# Patient Record
Sex: FEMALE | Race: WHITE | NOT HISPANIC OR LATINO
[De-identification: names, ages, dates, MRNs, and addresses within clinical notes are randomized per-mention and may not be internally consistent; named-entity substitution may affect disease eponyms.]

---

## 2023-12-05 ENCOUNTER — DASHBOARD ENCOUNTERS (OUTPATIENT)
Age: 23
End: 2023-12-05

## 2023-12-05 ENCOUNTER — OFFICE VISIT (OUTPATIENT)
Dept: URBAN - METROPOLITAN AREA CLINIC 54 | Facility: CLINIC | Age: 23
End: 2023-12-05
Payer: COMMERCIAL

## 2023-12-05 VITALS
HEIGHT: 68 IN | SYSTOLIC BLOOD PRESSURE: 112 MMHG | HEART RATE: 93 BPM | DIASTOLIC BLOOD PRESSURE: 78 MMHG | WEIGHT: 180 LBS | BODY MASS INDEX: 27.28 KG/M2 | TEMPERATURE: 97.5 F

## 2023-12-05 DIAGNOSIS — Z86.19 HISTORY OF CLOSTRIDIUM DIFFICILE INFECTION: ICD-10-CM

## 2023-12-05 DIAGNOSIS — K90.89 BILE ACID MALABSORPTION SYNDROME: ICD-10-CM

## 2023-12-05 DIAGNOSIS — R19.7 DIARRHEA, UNSPECIFIED TYPE: ICD-10-CM

## 2023-12-05 DIAGNOSIS — K76.0 NAFLD (NONALCOHOLIC FATTY LIVER DISEASE): ICD-10-CM

## 2023-12-05 PROCEDURE — 99245 OFF/OP CONSLTJ NEW/EST HI 55: CPT

## 2023-12-05 PROCEDURE — 99205 OFFICE O/P NEW HI 60 MIN: CPT

## 2023-12-05 RX ORDER — LORAZEPAM 1 MG/1
1 TABLET AT BEDTIME AS NEEDED TABLET ORAL ONCE A DAY
Status: ACTIVE | COMMUNITY

## 2023-12-05 RX ORDER — GABAPENTIN 800 MG/1
1 TABLET TABLET, FILM COATED ORAL ONCE A DAY
Status: ACTIVE | COMMUNITY

## 2023-12-05 RX ORDER — CEFDINIR 300 MG/1
AS DIRECTED CAPSULE ORAL
Status: ACTIVE | COMMUNITY

## 2023-12-05 RX ORDER — DICYCLOMINE HYDROCHLORIDE 20 MG/1
1 TABLET TABLET ORAL THREE TIMES A DAY
Status: ON HOLD | COMMUNITY

## 2023-12-05 RX ORDER — VALACYCLOVIR 500 MG/1
1 TABLET TABLET, FILM COATED ORAL ONCE A DAY
Status: ACTIVE | COMMUNITY

## 2023-12-05 RX ORDER — PROMETHAZINE HYDROCHLORIDE 50 MG/1
0.5 TABLET AS NEEDED TABLET ORAL
Status: ACTIVE | COMMUNITY

## 2023-12-05 RX ORDER — BENZONATATE 100 MG/1
1 CAPSULE AS NEEDED CAPSULE ORAL THREE TIMES A DAY
Status: ACTIVE | COMMUNITY

## 2023-12-05 RX ORDER — CHOLESTYRAMINE 4 G/9G
1 PACKET MIXED WITH WATER OR NON-CARBONATED DRINK POWDER, FOR SUSPENSION ORAL
Qty: 90 | Refills: 3 | OUTPATIENT
Start: 2023-12-18

## 2023-12-05 NOTE — HPI-TODAY'S VISIT:
12/5/23: Patient was referred by Dr. Huong Pichardo for recurrent C. diff colitis. A copy of this document will be sent to the provider. Pt is a 21 yo female with an extensive past medical history including autoimmune hemolytic anemia requiring splenectomy in 2/2022 complicated by intra-abdominal MRSA abscess, followed by gram-positive bacteremia, recurrent Cdiff s/p eventual fecal transplant in 6/2023, biliary dyskinesia s/p CCY plus ventral and umbilical hernia repaired 5/2023, NAFLD, spinal headache requiring 2 blood patches, kidney stones, adrenal insufficiency, POTS, ADHD, chronic PTSD, MDD with anxiety attacks, chronic insomnia, currently being evaluated for EDs, who presents for third opinion (previously followed by [facility] and [facility]) on persistent diarrhea.  - Diarrhea hx: Pt initially developed frequent, watery BMs after completing 2 months of parenteral abx s/p splenectomy complicated by retroperitoneal abscess and bacteremia in Feb 2022. Initial stool studies were negative for Clostridium difficile colitis. Per [facility] notes, pt underwent EGD/cscope in April 2022, which were reportedly normal. Between February and April 2023, pt had a 2nd colonoscopy for ongoing diarrhea which was unremarkable other than benign polyps. Stool studies at that time was positive for Cdiff. Pt was treated with vancomycin which immediately improved symptoms, but diarrhea recurred 3 weeks after completing abx. Repeat Cdiff toxin was positive in June 2023, indicating pt's first recurrence. She was treated with fidaxomicin again with relief then recurrent symptoms 2 weeks later. At this point pt was sent for FMT which was done by Dr. Ibarra on 6/27/23. Pt unfortunately continues to have diarrhea, complaining of 5-20 BMs per day (6-7 [place] scale); sometimes sees mucus vs blood. She has continued to find answers with [facility] and the [facility], but per pt they think it is anxiety and are she feels like they are giving up on her. Repeat EGD/cscope was done with [facility] in Nov 2023 to rule out MC and Crohn's, both unremarkable. She has had extensive testing with [facility] who attributes her diarrhea to severe bile acid malabsorption rather than recurrent Cdiff despite positive retests. Per consult note: "her lack of response to several courses of vancomycin, fidaxomicin, and fecal transplant indicates that she is a C diff carrier and that her symptoms are not likely due to active C diff infection." [place] did check 72 hour fecal fat and bile acids, fecal protectin, osmolarity and electrolytes - which reportedly showed severe bile acid malabsorption: "Stool tests showed rather strong evidence for something called bile acid malabsorption, although the diarrhea was relatively mild based on the total stool volume. Anyhow, I appreciate that symptoms can wax and wane and you may have had relatively mild symptoms during this stool collection. With the results of bile acid testing I would recommend another trial of a bile acid binder. I believe you have tried colestipol in the past but there are other drugs such as colesevelam or cholestyramine that you should discuss with your local doctors if you have not tried them already." Unclear what other bile acid sequestrants pt has tried with [facility] (if any) as those records are not available. [place] also recommended SIBO testing and per pt she already took Xifaxan without relief.  - NAFLD: Pt reports a hx of PASTOR, unclear if she has ever had a liver bx? Long hx of elevated liver enzymes in the past. Over the last 2 years pt has had intermittent transaminitis, as high as 300. Briefly had jaundice associated with hemolytic anemia. S/p CCY for abnormal HIDA scan. Recent fibroscan showed no significant fibrosis (4.4 kPa) and S3 steatosis (). Most recent laboratory studies show a mild elevation in transaminases, normal alkaline phosphatase and bilirubin. 11/14: AST 21, ALT 50, AP 73, TB 0.3, albumin 4.7, Plt 517, INR 0.97.  CT abd/pelvis W 11/14/23: - No acute abnormality is seen on this exam to explain the patient's symptoms. - Postsurgical change. - Multiple small lymph nodes are seen within the mesentery likely reflecting mesenteric adenitis. - Ventral abdominal wall defect with herniated mesenteric fat.  EGD 11/13/23: - Esophagus: regular z line, 39 cm from incisors - Stomach: Normal appearing mucosa. Random gastric biopsies taken with cold forceps per [facility] protocol for H pylori - Duodenum: normal, cold biopsies taken from the second portion and bulb with cold forceps to evaluate for celiac disease. Biopsy: Reactive gastropathy with focal chronic inflammation; No H pylori, dysplasia, or malignancy. Duodenal mucosa with no significant histopathologic abnormality; no celiac disease.   Colonoscopy 11/13/23:  - Normal colonic mucosa throughout, random cold biopsies taken from each segment evaluate for diarrhea - Normal-appearing terminal ileum, multiple cold biopsies taken. Biopsy: Random colon biopsy - colonic mucosa with focal reactive-appearing lymphoid aggregates; No definite dysplasia or malignancy is seen; Negative for significant active inflammation, intraepithelial lymphocytosis or subepithelial collagen thickening; No definite dysplasia or malignancy is seen. Terminal ileum, biopsy - Ileal mucosa with no significant histopathologic abnormality.  Her PCP is Dr. Pichardo at [facility]. Her psychiatrist is Dr. Connor at [facility] psychiatry in [place]. Her cardiologist is Dr. Edmundo Torres, at [facility] in [place]. Her endocrinologist is Dr. Tolentino at [facility] endocrinology. Pt's [facility] consults have included: internal medicine, GI (with referral to their C diff clinic), cardiology, endocrinology, heme/onc, and medical genetics.

## 2023-12-18 PROBLEM — 1231824009: Status: ACTIVE | Noted: 2023-12-18

## 2023-12-18 PROBLEM — 44332000: Status: ACTIVE | Noted: 2023-12-18

## 2024-01-22 ENCOUNTER — OFFICE VISIT (OUTPATIENT)
Dept: URBAN - METROPOLITAN AREA CLINIC 54 | Facility: CLINIC | Age: 24
End: 2024-01-22

## 2024-08-02 ENCOUNTER — OFFICE VISIT (OUTPATIENT)
Dept: URBAN - METROPOLITAN AREA CLINIC 54 | Facility: CLINIC | Age: 24
End: 2024-08-02
Payer: COMMERCIAL

## 2024-08-02 VITALS
DIASTOLIC BLOOD PRESSURE: 75 MMHG | HEIGHT: 68 IN | HEART RATE: 89 BPM | WEIGHT: 195 LBS | TEMPERATURE: 98.1 F | BODY MASS INDEX: 29.55 KG/M2 | SYSTOLIC BLOOD PRESSURE: 128 MMHG

## 2024-08-02 DIAGNOSIS — K90.89 BILE ACID MALABSORPTION SYNDROME: ICD-10-CM

## 2024-08-02 DIAGNOSIS — R19.7 DIARRHEA, UNSPECIFIED TYPE: ICD-10-CM

## 2024-08-02 DIAGNOSIS — K62.89 RECTAL PAIN: ICD-10-CM

## 2024-08-02 DIAGNOSIS — R11.2 NAUSEA AND VOMITING, UNSPECIFIED VOMITING TYPE: ICD-10-CM

## 2024-08-02 PROCEDURE — 99214 OFFICE O/P EST MOD 30 MIN: CPT | Performed by: PHYSICIAN ASSISTANT

## 2024-08-02 RX ORDER — TRAZODONE HYDROCHLORIDE 100 MG/1
1 TABLET AT BEDTIME TABLET ORAL ONCE A DAY
Status: ACTIVE | COMMUNITY

## 2024-08-02 RX ORDER — NICOTINE 21 MG/24HR
1 PATCH TO SKIN PATCH, TRANSDERMAL 24 HOURS TRANSDERMAL ONCE A DAY
Status: ACTIVE | COMMUNITY

## 2024-08-02 RX ORDER — CLOTRIMAZOLE AND BETAMETHASONE DIPROPIONATE 10; .5 MG/G; MG/G
1 APPLICATION CREAM TOPICAL TWICE A DAY
Status: ACTIVE | COMMUNITY

## 2024-08-02 RX ORDER — OXYCODONE HYDROCHLORIDE AND ACETAMINOPHEN 10; 325 MG/1; MG/1
1 TABLET AS NEEDED TABLET ORAL
Status: ACTIVE | COMMUNITY

## 2024-08-02 RX ORDER — GABAPENTIN 800 MG/1
1 TABLET TABLET, FILM COATED ORAL ONCE A DAY
Status: ACTIVE | COMMUNITY

## 2024-08-02 RX ORDER — MINERAL OIL, PETROLATUM, PHENYLEPHRINE HCL 2.5; 140; 749 MG/G; MG/G; MG/G
AS DIRECTED OINTMENT TOPICAL
Status: ACTIVE | COMMUNITY

## 2024-08-02 RX ORDER — HYDROCORTISONE ACETATE 25 MG/1
1 SUPPOSITORY SUPPOSITORY RECTAL ONCE A DAY
Status: ACTIVE | COMMUNITY

## 2024-08-02 RX ORDER — NICOTINE 14MG/24HR
1 PATCH TO SKIN PATCH, TRANSDERMAL 24 HOURS TRANSDERMAL ONCE A DAY
Status: ACTIVE | COMMUNITY

## 2024-08-02 RX ORDER — BUPROPION HYDROCHLORIDE 150 MG/1
1 TABLET IN THE MORNING TABLET, EXTENDED RELEASE ORAL ONCE A DAY
Status: ACTIVE | COMMUNITY

## 2024-08-02 RX ORDER — CYCLOBENZAPRINE HYDROCHLORIDE 10 MG/1
1 TABLET AT BEDTIME AS NEEDED TABLET, FILM COATED ORAL ONCE A DAY
Status: ACTIVE | COMMUNITY

## 2024-08-02 RX ORDER — VALACYCLOVIR 500 MG/1
1 TABLET TABLET, FILM COATED ORAL ONCE A DAY
Status: ACTIVE | COMMUNITY

## 2024-08-02 RX ORDER — HYDROCORTISONE 25 MG/G
1 APPLICATION CREAM TOPICAL ONCE A DAY
Status: ACTIVE | COMMUNITY

## 2024-08-02 RX ORDER — NICOTINE 7MG/24HR
1 PATCH TO SKIN PATCH, TRANSDERMAL 24 HOURS TRANSDERMAL ONCE A DAY
Status: ACTIVE | COMMUNITY

## 2024-08-02 RX ORDER — DIAZEPAM 5 MG/1
1 TABLET AS NEEDED TABLET ORAL ONCE A DAY
Status: ACTIVE | COMMUNITY

## 2024-08-02 RX ORDER — PROMETHAZINE HYDROCHLORIDE 50 MG/1
0.5 TABLET AS NEEDED TABLET ORAL
Status: ACTIVE | COMMUNITY

## 2024-08-02 RX ORDER — DICLOFENAC SODIUM TOPICAL GEL, 1% 10 MG/G
AS DIRECTED GEL TOPICAL
Status: ACTIVE | COMMUNITY

## 2024-08-02 RX ORDER — ERGOCALCIFEROL 1.25 MG/1
1 CAPSULE CAPSULE ORAL
Status: ACTIVE | COMMUNITY

## 2024-08-02 RX ORDER — CHOLESTYRAMINE 4 G/9G
1 PACKET MIXED WITH WATER OR NON-CARBONATED DRINK POWDER, FOR SUSPENSION ORAL
Qty: 90 | Refills: 3 | Status: ACTIVE | COMMUNITY
Start: 2023-12-18

## 2024-08-02 RX ORDER — LETROZOLE 2.5 MG/1
1 TABLET TABLET, FILM COATED ORAL ONCE A DAY
Status: ACTIVE | COMMUNITY

## 2024-08-02 RX ORDER — NITROGLYCERIN 0.4 MG/1
AS DIRECTED TABLET SUBLINGUAL
Status: ACTIVE | COMMUNITY

## 2024-08-02 NOTE — HPI-TODAY'S VISIT:
12/5/23: Patient was referred by Dr. Huong Pichardo for recurrent C. diff colitis. A copy of this document will be sent to the provider. Pt is a 23 yo female with an extensive past medical history including autoimmune hemolytic anemia requiring splenectomy in 2/2022 complicated by intra-abdominal MRSA abscess, followed by gram-positive bacteremia, recurrent Cdiff s/p eventual fecal transplant in 6/2023, biliary dyskinesia s/p CCY plus ventral and umbilical hernia repaired 5/2023, NAFLD, spinal headache requiring 2 blood patches, kidney stones, adrenal insufficiency, POTS, ADHD, chronic PTSD, MDD with anxiety attacks, chronic insomnia, currently being evaluated for EDs, who presents for third opinion (previously followed by [facility] and [facility]) on persistent diarrhea.  - Diarrhea hx: Pt initially developed frequent, watery BMs after completing 2 months of parenteral abx s/p splenectomy complicated by retroperitoneal abscess and bacteremia in Feb 2022. Initial stool studies were negative for Clostridium difficile colitis. Per [facility] notes, pt underwent EGD/cscope in April 2022, which were reportedly normal. Between February and April 2023, pt had a 2nd colonoscopy for ongoing diarrhea which was unremarkable other than benign polyps. Stool studies at that time was positive for Cdiff. Pt was treated with vancomycin which immediately improved symptoms, but diarrhea recurred 3 weeks after completing abx. Repeat Cdiff toxin was positive in June 2023, indicating pt's first recurrence. She was treated with fidaxomicin again with relief then recurrent symptoms 2 weeks later. At this point pt was sent for FMT which was done by Dr. Ibarra on 6/27/23. Pt unfortunately continues to have diarrhea, complaining of 5-20 BMs per day (6-7 [place] scale); sometimes sees mucus vs blood. She has continued to find answers with [facility] and the [facility], but per pt they think it is anxiety and are she feels like they are giving up on her. Repeat EGD/cscope was done with [facility] in Nov 2023 to rule out MC and Crohn's, both unremarkable. She has had extensive testing with [facility] who attributes her diarrhea to severe bile acid malabsorption rather than recurrent Cdiff despite positive retests. Per consult note: "her lack of response to several courses of vancomycin, fidaxomicin, and fecal transplant indicates that she is a C diff carrier and that her symptoms are not likely due to active C diff infection." [place] did check 72 hour fecal fat and bile acids, fecal protectin, osmolarity and electrolytes - which reportedly showed severe bile acid malabsorption: "Stool tests showed rather strong evidence for something called bile acid malabsorption, although the diarrhea was relatively mild based on the total stool volume. Anyhow, I appreciate that symptoms can wax and wane and you may have had relatively mild symptoms during this stool collection. With the results of bile acid testing I would recommend another trial of a bile acid binder. I believe you have tried colestipol in the past but there are other drugs such as colesevelam or cholestyramine that you should discuss with your local doctors if you have not tried them already." Unclear what other bile acid sequestrants pt has tried with [facility] (if any) as those records are not available. [place] also recommended SIBO testing and per pt she already took Xifaxan without relief.  - NAFLD: Pt reports a hx of PASTOR, unclear if she has ever had a liver bx? Long hx of elevated liver enzymes in the past. Over the last 2 years pt has had intermittent transaminitis, as high as 300. Briefly had jaundice associated with hemolytic anemia. S/p CCY for abnormal HIDA scan. Recent fibroscan showed no significant fibrosis (4.4 kPa) and S3 steatosis (). Most recent laboratory studies show a mild elevation in transaminases, normal alkaline phosphatase and bilirubin. 11/14: AST 21, ALT 50, AP 73, TB 0.3, albumin 4.7, Plt 517, INR 0.97.  CT abd/pelvis W 11/14/23: - No acute abnormality is seen on this exam to explain the patient's symptoms. - Postsurgical change. - Multiple small lymph nodes are seen within the mesentery likely reflecting mesenteric adenitis. - Ventral abdominal wall defect with herniated mesenteric fat.  EGD 11/13/23: - Esophagus: regular z line, 39 cm from incisors - Stomach: Normal appearing mucosa. Random gastric biopsies taken with cold forceps per [facility] protocol for H pylori - Duodenum: normal, cold biopsies taken from the second portion and bulb with cold forceps to evaluate for celiac disease. Biopsy: Reactive gastropathy with focal chronic inflammation; No H pylori, dysplasia, or malignancy. Duodenal mucosa with no significant histopathologic abnormality; no celiac disease.   Colonoscopy 11/13/23:  - Normal colonic mucosa throughout, random cold biopsies taken from each segment evaluate for diarrhea - Normal-appearing terminal ileum, multiple cold biopsies taken. Biopsy: Random colon biopsy - colonic mucosa with focal reactive-appearing lymphoid aggregates; No definite dysplasia or malignancy is seen; Negative for significant active inflammation, intraepithelial lymphocytosis or subepithelial collagen thickening; No definite dysplasia or malignancy is seen. Terminal ileum, biopsy - Ileal mucosa with no significant histopathologic abnormality.  Her PCP is Dr. Pichardo at [facility]. Her psychiatrist is Dr. Connor at [facility] psychiatry in [place]. Her cardiologist is Dr. Edmundo Torres, at [facility] in [place]. Her endocrinologist is Dr. Tolentino at [facility] endocrinology. Pt's [facility] consults have included: internal medicine, GI (with referral to their C diff clinic), cardiology, endocrinology, heme/onc, and medical genetics.  8/2/24: Patient presents with complaints of rectal pain and hemorrhoids.  Patient states she was seen by Dr. Caldwell for hemorrhoidectomy, however given significant rectal tenderness on exam, she states it was recommended by Dr Caldwell to follow-up with GI for further evaluation.  Recent EGD and colonoscopy above obtained with [facility]. Not currently on fiber. Admits to straining despite reports of hronic diarrhea. She has a pending O&P from her PCP. She also mentions daily NV without signs of hematemesis. On longterm PPI. Patient is requesting both EGD and colonoscopy. Of note, she had a ?stroke (wrist drop) in Feb 2024. Denies following up with neurology.